# Patient Record
Sex: FEMALE
[De-identification: names, ages, dates, MRNs, and addresses within clinical notes are randomized per-mention and may not be internally consistent; named-entity substitution may affect disease eponyms.]

---

## 2019-08-01 PROBLEM — Z00.00 ENCOUNTER FOR PREVENTIVE HEALTH EXAMINATION: Status: ACTIVE | Noted: 2019-08-01

## 2019-08-05 ENCOUNTER — APPOINTMENT (OUTPATIENT)
Dept: ORTHOPEDIC SURGERY | Facility: CLINIC | Age: 39
End: 2019-08-05

## 2019-08-06 ENCOUNTER — APPOINTMENT (OUTPATIENT)
Dept: ORTHOPEDIC SURGERY | Facility: CLINIC | Age: 39
End: 2019-08-06

## 2022-07-14 ENCOUNTER — APPOINTMENT (OUTPATIENT)
Dept: ORTHOPEDIC SURGERY | Facility: CLINIC | Age: 42
End: 2022-07-14

## 2022-07-14 VITALS — WEIGHT: 228 LBS | HEIGHT: 62 IN | BODY MASS INDEX: 41.96 KG/M2

## 2022-07-14 DIAGNOSIS — M25.571 PAIN IN RIGHT ANKLE AND JOINTS OF RIGHT FOOT: ICD-10-CM

## 2022-07-14 DIAGNOSIS — R73.02 IMPAIRED GLUCOSE TOLERANCE (ORAL): ICD-10-CM

## 2022-07-14 DIAGNOSIS — Z78.9 OTHER SPECIFIED HEALTH STATUS: ICD-10-CM

## 2022-07-14 DIAGNOSIS — M54.50 LOW BACK PAIN, UNSPECIFIED: ICD-10-CM

## 2022-07-14 DIAGNOSIS — M53.9 LOW BACK PAIN, UNSPECIFIED: ICD-10-CM

## 2022-07-14 DIAGNOSIS — Z82.49 FAMILY HISTORY OF ISCHEMIC HEART DISEASE AND OTHER DISEASES OF THE CIRCULATORY SYSTEM: ICD-10-CM

## 2022-07-14 DIAGNOSIS — M17.11 UNILATERAL PRIMARY OSTEOARTHRITIS, RIGHT KNEE: ICD-10-CM

## 2022-07-14 PROCEDURE — 72100 X-RAY EXAM L-S SPINE 2/3 VWS: CPT

## 2022-07-14 PROCEDURE — 99204 OFFICE O/P NEW MOD 45 MIN: CPT

## 2022-07-14 PROCEDURE — 73562 X-RAY EXAM OF KNEE 3: CPT | Mod: RT

## 2022-07-14 PROCEDURE — 73630 X-RAY EXAM OF FOOT: CPT | Mod: RT

## 2022-07-14 RX ORDER — MELOXICAM 15 MG/1
15 TABLET ORAL DAILY
Qty: 14 | Refills: 2 | Status: ACTIVE | COMMUNITY
Start: 2022-07-14 | End: 1900-01-01

## 2022-07-14 NOTE — DISCUSSION/SUMMARY
[Medication Risks Reviewed] : Medication risks reviewed [de-identified] : Options for treatment discussion she'll do some physical therapy. I prescribed an anti-inflammatory. If his symptoms fail to resolve she'll let me know. Followup will be as needed.

## 2022-07-14 NOTE — PHYSICAL EXAM
[de-identified] : Lumbar spine exam shows no tenderness full range of motion straight leg raising negative decreased sensation over the lateral aspect the calf and foot. No weakness and otherwise neurovascular exam is normal\par \par Right knee shows no one of her swelling mild crepitus medially where there is some tenderness negative Eduardo no instability\par \par Right foot shows no pain on palpation just a slight decrease over the lateral aspect of the foot [de-identified] : Right foot x-ray shows no evidence of fracture dislocation. Preserved joint spaces\par Right knee x-ray shows medial joint space and patellofemoral arthritis with spurring and narrowing\par \par Lumbar spine x-ray shows L5-S1 degenerative disc disease at significant

## 2022-07-14 NOTE — HISTORY OF PRESENT ILLNESS
[de-identified] : Location: lower back dull pain after long time sitting, right knee achy pain (medial side) and unstable during walking, right foot numbness (especially 4th and 5th toes)  and swelling. \par Duration: progressively worst since last year and numbness 07/01/22 \par Context: no history of resent injury. regarding patient: had sciatica, injury right knee and 5th toe of the right side.\par Quality: achy, crampy\par Aggravating factors: walking, standing \par Associated symptoms: swelling\par Conservative treatment: rest, naproxen\par Prior studies: non

## 2023-08-24 ENCOUNTER — NON-APPOINTMENT (OUTPATIENT)
Age: 43
End: 2023-08-24